# Patient Record
Sex: MALE | Race: WHITE | NOT HISPANIC OR LATINO
[De-identification: names, ages, dates, MRNs, and addresses within clinical notes are randomized per-mention and may not be internally consistent; named-entity substitution may affect disease eponyms.]

---

## 2017-01-23 ENCOUNTER — RECORDS - HEALTHEAST (OUTPATIENT)
Dept: ADMINISTRATIVE | Facility: OTHER | Age: 22
End: 2017-01-23

## 2017-02-02 ENCOUNTER — OFFICE VISIT - HEALTHEAST (OUTPATIENT)
Dept: FAMILY MEDICINE | Facility: CLINIC | Age: 22
End: 2017-02-02

## 2017-02-02 DIAGNOSIS — F41.9 ANXIETY: ICD-10-CM

## 2017-02-02 DIAGNOSIS — R10.13 EPIGASTRIC PAIN: ICD-10-CM

## 2017-02-02 DIAGNOSIS — D17.9 LIPOMA: ICD-10-CM

## 2017-02-02 ASSESSMENT — MIFFLIN-ST. JEOR: SCORE: 1706.16

## 2017-04-11 ENCOUNTER — OFFICE VISIT - HEALTHEAST (OUTPATIENT)
Dept: FAMILY MEDICINE | Facility: CLINIC | Age: 22
End: 2017-04-11

## 2017-04-11 DIAGNOSIS — R10.13 EPIGASTRIC PAIN: ICD-10-CM

## 2017-04-11 DIAGNOSIS — R19.7 DIARRHEA, UNSPECIFIED TYPE: ICD-10-CM

## 2017-04-11 ASSESSMENT — MIFFLIN-ST. JEOR: SCORE: 1685.69

## 2017-04-12 ENCOUNTER — AMBULATORY - HEALTHEAST (OUTPATIENT)
Dept: LAB | Facility: CLINIC | Age: 22
End: 2017-04-12

## 2017-04-12 DIAGNOSIS — R19.7 DIARRHEA, UNSPECIFIED TYPE: ICD-10-CM

## 2017-04-13 ENCOUNTER — COMMUNICATION - HEALTHEAST (OUTPATIENT)
Dept: FAMILY MEDICINE | Facility: CLINIC | Age: 22
End: 2017-04-13

## 2017-04-13 LAB
GLIADIN IGA SER-ACNC: 1.4 U/ML
GLIADIN IGG SER-ACNC: 1.6 U/ML
IGA SERPL-MCNC: 108 MG/DL (ref 65–400)
TTG IGA SER-ACNC: 0.2 U/ML
TTG IGG SER-ACNC: 1.2 U/ML

## 2017-04-14 ENCOUNTER — AMBULATORY - HEALTHEAST (OUTPATIENT)
Dept: LAB | Facility: CLINIC | Age: 22
End: 2017-04-14

## 2017-04-14 DIAGNOSIS — R19.7 DIARRHEA, UNSPECIFIED TYPE: ICD-10-CM

## 2017-04-25 ENCOUNTER — RECORDS - HEALTHEAST (OUTPATIENT)
Dept: ADMINISTRATIVE | Facility: OTHER | Age: 22
End: 2017-04-25

## 2017-05-05 ENCOUNTER — COMMUNICATION - HEALTHEAST (OUTPATIENT)
Dept: FAMILY MEDICINE | Facility: CLINIC | Age: 22
End: 2017-05-05

## 2017-05-05 DIAGNOSIS — R10.13 EPIGASTRIC PAIN: ICD-10-CM

## 2019-05-10 ENCOUNTER — OFFICE VISIT - HEALTHEAST (OUTPATIENT)
Dept: FAMILY MEDICINE | Facility: CLINIC | Age: 24
End: 2019-05-10

## 2019-05-10 DIAGNOSIS — Z00.00 ROUTINE GENERAL MEDICAL EXAMINATION AT A HEALTH CARE FACILITY: ICD-10-CM

## 2019-05-10 DIAGNOSIS — Z13.1 DIABETES MELLITUS SCREENING: ICD-10-CM

## 2019-05-10 DIAGNOSIS — Z13.220 LIPID SCREENING: ICD-10-CM

## 2019-05-10 LAB
CHOLEST SERPL-MCNC: 167 MG/DL
FASTING STATUS PATIENT QL REPORTED: YES
FASTING STATUS PATIENT QL REPORTED: YES
GLUCOSE BLD-MCNC: 83 MG/DL (ref 70–125)
HDLC SERPL-MCNC: 63 MG/DL
LDLC SERPL CALC-MCNC: 94 MG/DL
TRIGL SERPL-MCNC: 52 MG/DL

## 2019-05-10 ASSESSMENT — MIFFLIN-ST. JEOR: SCORE: 1687.84

## 2019-05-12 ENCOUNTER — COMMUNICATION - HEALTHEAST (OUTPATIENT)
Dept: FAMILY MEDICINE | Facility: CLINIC | Age: 24
End: 2019-05-12

## 2019-09-24 ENCOUNTER — COMMUNICATION - HEALTHEAST (OUTPATIENT)
Dept: FAMILY MEDICINE | Facility: CLINIC | Age: 24
End: 2019-09-24

## 2019-10-02 ENCOUNTER — OFFICE VISIT - HEALTHEAST (OUTPATIENT)
Dept: FAMILY MEDICINE | Facility: CLINIC | Age: 24
End: 2019-10-02

## 2019-10-02 DIAGNOSIS — Z71.84 COUNSELING FOR TRAVEL: ICD-10-CM

## 2019-10-02 ASSESSMENT — MIFFLIN-ST. JEOR: SCORE: 1744.99

## 2021-05-28 NOTE — PROGRESS NOTES
Assessment and Plan:     1. Routine general medical examination at a health care facility  Discussed consuming a healthy diet and exercising.  Updated Td vaccine.  He is content with the plan.   - Td, Preservative Free (green label)    2. Diabetes mellitus screening  - Glucose    3. Lipid screening  - Lipid Cascade    Subjective:     Talon is a 24 y.o. male presenting to the clinic for a male physical.  He has been  for 2 years.  He does not have any children.  He is consuming a healthy diet and jogs 1-2 times/week for exercise.  He is feeling well today and has no other concerns.  He denies chest pain, shortness of breath with exertion, edema, orthopnea, syncope.     Review of Systems: A complete 14 point review of systems was obtained and is negative or as stated in the history of present illness.    Social History     Socioeconomic History     Marital status:      Spouse name: Not on file     Number of children: Not on file     Years of education: Not on file     Highest education level: Not on file   Occupational History     Not on file   Social Needs     Financial resource strain: Not on file     Food insecurity:     Worry: Not on file     Inability: Not on file     Transportation needs:     Medical: Not on file     Non-medical: Not on file   Tobacco Use     Smoking status: Never Smoker     Smokeless tobacco: Never Used   Substance and Sexual Activity     Alcohol use: Not on file     Drug use: No     Sexual activity: Never   Lifestyle     Physical activity:     Days per week: Not on file     Minutes per session: Not on file     Stress: Not on file   Relationships     Social connections:     Talks on phone: Not on file     Gets together: Not on file     Attends Voodoo service: Not on file     Active member of club or organization: Not on file     Attends meetings of clubs or organizations: Not on file     Relationship status: Not on file     Intimate partner violence:     Fear of current or ex  "partner: Not on file     Emotionally abused: Not on file     Physically abused: Not on file     Forced sexual activity: Not on file   Other Topics Concern     Not on file   Social History Narrative     Not on file       Active Ambulatory Problems     Diagnosis Date Noted     No Active Ambulatory Problems     Resolved Ambulatory Problems     Diagnosis Date Noted     No Resolved Ambulatory Problems     No Additional Past Medical History       Family History   Problem Relation Age of Onset     No Medical Problems Mother      No Medical Problems Father      No Medical Problems Sister      Cancer Maternal Grandfather         lung     Heart disease Paternal Grandmother        Objective:     /60   Pulse 74   Ht 5' 11.25\" (1.81 m)   Wt 151 lb 6.4 oz (68.7 kg)   SpO2 98%   BMI 20.97 kg/m      General Appearance:    Alert, cooperative, no distress, appears stated age   Head:    Normocephalic, without obvious abnormality, atraumatic   Eyes:    PERRL, conjunctiva/corneas clear, EOM's intact, fundi     benign, both eyes        Ears:    Normal TM's and external ear canals, both ears   Nose:   Nares normal, septum midline, mucosa normal, no drainage    or sinus tenderness   Throat:   Lips, mucosa, and tongue normal; teeth and gums normal   Neck:   Supple, symmetrical, trachea midline, no adenopathy;        thyroid:  No enlargement/tenderness/nodules; no carotid    bruit or JVD   Back:     Symmetric, no curvature, ROM normal, no CVA tenderness   Lungs:     Clear to auscultation bilaterally, respirations unlabored   Chest wall:    No tenderness or deformity   Heart:    Regular rate and rhythm, S1 and S2 normal, no murmur, rub    or gallop   Abdomen:     Soft, non-tender, bowel sounds active all four quadrants,     no masses, no organomegaly   Genitalia:    Deferred per patient   Rectal:    deferred   Extremities:   Extremities normal, atraumatic, no cyanosis or edema   Pulses:   2+ and symmetric all extremities   Skin:   " Skin color, texture, turgor normal, no rashes or lesions   Lymph nodes:   Cervical, supraclavicular, and axillary nodes normal   Neurologic:   CNII-XII intact. Normal strength, sensation and reflexes       throughout

## 2021-05-30 VITALS — WEIGHT: 158.06 LBS | HEIGHT: 71 IN | BODY MASS INDEX: 22.13 KG/M2

## 2021-05-30 VITALS — BODY MASS INDEX: 21.25 KG/M2 | WEIGHT: 151.8 LBS | HEIGHT: 71 IN

## 2021-06-01 NOTE — PATIENT INSTRUCTIONS - HE
The CDC website has excellent recommendations for travel.  Be sure to take a look at it: https://wwwnc.cdc.gov/travel/destinations/list/    You are up-to-date with hepatitis A, hepatitis B, measles, tetanus, and your routine vaccinations.  If you are going to be in an area with wild animals, you could consider the rabies vaccine series though this likely will not be needed.    I have sent a prescription for the oral typhoid vaccine.  If this is not well covered by insurance, you may ask the pharmacist about the injected typhoid vaccine.    Be sure to receive your influenza vaccine this fall.  Let us know when you receive it and we will keep it as part of your record.

## 2021-06-01 NOTE — PROGRESS NOTES
Assessment/Plan:     1. Counseling for travel  Information provided from [facility] website in regards to travel recommendations.  Prescription provided for oral typhoid vaccine.  In the event that this is not an covered by his insurance, he could consider the typhoid injection as well.  He will receive influenza vaccine this fall.  Otherwise up-to-date with routine vaccinations.  Reviewed travel safety measures.      Patient Instructions   The CDC website has excellent recommendations for travel.  Be sure to take a look at it: https://wwwnc.cdc.gov/travel/destinations/list/    You are up-to-date with hepatitis A, hepatitis B, measles, tetanus, and your routine vaccinations.  If you are going to be in an area with wild animals, you could consider the rabies vaccine series though this likely will not be needed.    I have sent a prescription for the oral typhoid vaccine.  If this is not well covered by insurance, you may ask the pharmacist about the injected typhoid vaccine.    Be sure to receive your influenza vaccine this fall.  Let us know when you receive it and we will keep it as part of your record.       Return in about 2 years (around 10/2/2021) for Annual physical.      Subjective:      Talon Kim is a 24 y.o. male presented to clinic today for travel consultation prior to travel to [place] where he will be visiting his wife's family.  He is not anticipating any animal exposure.  He is up-to-date with hepatitis A, hepatitis B, measles, and routine vaccinations.  He will be receiving influenza vaccine this fall.  He has never received typhoid vaccine previously.  He is otherwise healthy and takes no daily medications.    Current Outpatient Medications   Medication Sig Dispense Refill     multivitamin with minerals (THERA-M) 9 mg iron-400 mcg Tab tablet Take 1 tablet by mouth daily.       No current facility-administered medications for this visit.        Past Medical History, Family History, and Social History  reviewed.  History reviewed. No pertinent past medical history.  History reviewed. No pertinent surgical history.  Patient has no known allergies.  Family History   Problem Relation Age of Onset     No Medical Problems Mother      No Medical Problems Father      No Medical Problems Sister      Cancer Maternal Grandfather         lung     Heart disease Paternal Grandmother      Social History     Socioeconomic History     Marital status:      Spouse name: Not on file     Number of children: Not on file     Years of education: Not on file     Highest education level: Not on file   Occupational History     Not on file   Social Needs     Financial resource strain: Not on file     Food insecurity:     Worry: Not on file     Inability: Not on file     Transportation needs:     Medical: Not on file     Non-medical: Not on file   Tobacco Use     Smoking status: Never Smoker     Smokeless tobacco: Never Used   Substance and Sexual Activity     Alcohol use: Yes     Alcohol/week: 2.0 standard drinks     Types: 1 Glasses of wine, 1 Cans of beer per week     Drug use: No     Sexual activity: Never   Lifestyle     Physical activity:     Days per week: Not on file     Minutes per session: Not on file     Stress: Not on file   Relationships     Social connections:     Talks on phone: Not on file     Gets together: Not on file     Attends Confucianist service: Not on file     Active member of club or organization: Not on file     Attends meetings of clubs or organizations: Not on file     Relationship status: Not on file     Intimate partner violence:     Fear of current or ex partner: Not on file     Emotionally abused: Not on file     Physically abused: Not on file     Forced sexual activity: Not on file   Other Topics Concern     Not on file   Social History Narrative     Not on file         Review of systems is as stated in HPI, and the remainder of the 10 system review is otherwise negative.    Objective:     Vitals:     "10/02/19 0834   BP: 116/64   Pulse: 82   Resp: 20   Temp: 98.3  F (36.8  C)   TempSrc: Oral   SpO2: 100%   Weight: 164 lb (74.4 kg)   Height: 5' 11.25\" (1.81 m)    Body mass index is 22.71 kg/m .    Alert male.  Mucous membranes moist.  Heart with regular rate and rhythm.  Lungs clear.      This note has been dictated using voice recognition software. Any grammatical or context distortions are unintentional and inherent to the the software.   "

## 2021-06-03 VITALS — WEIGHT: 151.4 LBS | HEIGHT: 71 IN | BODY MASS INDEX: 21.19 KG/M2

## 2021-06-03 VITALS
WEIGHT: 164 LBS | DIASTOLIC BLOOD PRESSURE: 64 MMHG | RESPIRATION RATE: 20 BRPM | SYSTOLIC BLOOD PRESSURE: 116 MMHG | HEIGHT: 71 IN | TEMPERATURE: 98.3 F | OXYGEN SATURATION: 100 % | HEART RATE: 82 BPM | BODY MASS INDEX: 22.96 KG/M2

## 2021-06-08 NOTE — PROGRESS NOTES
Assessment  1. Epigastric pain  Comprehensive Metabolic Panel    HM2(CBC w/o Differential)    Lipase   2. Anxiety     3. Lipoma         Plan    1.  Epigastric pain: This is more of a discomfort.  Discussed differential diagnosis with patient.  Suspect some mild underlying gastritis as cause of symptoms, especially as he does have some associated heartburn and chest tightness.  Check comprehensive metabolic panel, hemogram and lipase today.  Recommend trial of omeprazole 20 mg daily.  Prescription sent to pharmacy.  Counseled on use of medication.  Advised avoidance of spicy and acidic foods.  Avoid caffeine and alcohol.  Notify me if symptoms not improving with these measures.  2.  Anxiety: Likely related to starting a new job and moving to a new residence.  May continue to use hydroxyzine on an as-needed basis.  3.  Lipomas: Reassurance provided.  If they increase in size or cause discomfort, discussed that they can be removed.      Subjective  Talon Kim is a 21 y.o. male who comes in today complaining of abdominal discomfort as well as anxiety.  He is a new patient to the clinic.  Recently moved to [place] and started a new job about 2 weeks ago.  Works as an .  Reports that around this time he started noticing some feelings of anxiety and nervousness.  Also had a tight feeling in his chest.  He went to an urgent care facility about a week ago because he was concerned that symptoms could be related to his heart.  He reports that an EKG was performed and it was normal.  No lab work was done.  He was told that symptoms were due to anxiety.  He was provided with a prescription for hydroxyzine to use for sleep.  He took that medication and felt that it was helpful.  He is no longer taking it every day because he is sleeping better.  States that he still continues to have that occasional tight feeling in his chest.  His main concern is that he has not developed indigestion after eating.  He will feel  "gassiness and upset/sour stomach.  This occurs no matter what types of foods he eats.  He has been trying to eat a more bland diet.  He feels nauseated but has not had vomiting.  Bowel movements have been a little bit loose but more recently he is a little bit on the constipated side.  He has not had bloody or black/tarry looking stools.  He does not take ibuprofen or aspirin on a regular basis.  He tried Pepto-Bismol for his symptoms but that was not very helpful.  He does not eat a lot of spicy or acidic foods.  Does not drink a lot of caffeine or alcohol.  He has also noticed a little bit of tenderness in his left side area.  He has occasional heartburn.  No dyspnea.  No fevers or chills.  He also notes a couple of lumps on his left thigh that has been present most of his life.  Remainder of review of systems is otherwise negative.  He is a healthy person with no history of chronic medical problems.  He takes no regular prescription medications.  No previous surgeries.  Family history is reviewed and entered into the chart.  He is single and has no children.  He does not smoke or use drugs.  Drinks alcohol infrequently.  Not sexually active.  He has no other concerns or questions today.    Current Outpatient Prescriptions   Medication Sig Dispense Refill     FOLIC ACID/MULTIVIT-MIN/LUTEIN (CENTRUM SILVER ORAL) Take by mouth daily.       hydrOXYzine (ATARAX) 25 MG tablet Take 25 mg by mouth.       omeprazole (PRILOSEC) 20 MG capsule Take 1 capsule (20 mg total) by mouth daily. 30 capsule 1     No current facility-administered medications for this visit.          Objective   Visit Vitals     /60 (Patient Site: Right Arm, Patient Position: Sitting, Cuff Size: Adult Regular)     Pulse (!) 103     Temp 97.5  F (36.4  C) (Tympanic)     Ht 5' 10.5\" (1.791 m)     Wt 158 lb 1 oz (71.7 kg)     SpO2 99%     BMI 22.36 kg/m2         Gen: alert, no acute distress  HEENT;  NCAT, TMs normal, nose clear, OP clear  Neck: " supple, no lymphadenopathy, thyroid normal  CV: RRR, no murmur  Resp: CTAB, no wheeze/crackles  Abd: normal bowel sounds, soft, non-tender, non-distended, no masses  Ext: warm, dry, no edema  Skin: 2 discrete soft tissue masses present in lateral left thigh and anteromedial left thigh suggestive of lipomas    PHQ-9: 4  PARAS-7: 7

## 2021-06-10 NOTE — PROGRESS NOTES
Talon is a 22 y.o. male presenting to the clinic for concerns of ongoing abdominal pain.  Patient was seen on 2/2/17 with concerns of epigastric pain.  He was treated with omeprazole.  Patient states he found minimal relief and stopped the medication 1 month ago.  He states his symptoms began in January when he was feeling anxious about his new job.  Patient states anxiety has improved but he continues to experience symptoms.  He has had a loose bowel movement daily in the morning.  He denies blood or mucus in his stool.  He has not taken any recent antibiotics but did travel to [place] in January.  He is taking Advil once every 2 weeks.  He describes the pain as an intermittent dull ache within his mid epigastric and left upper abdomen.  He denies any correlation of symptoms with consumption of food.  He denies family history of colon abnormalities.    Review of Systems: A complete 14 point review of systems was obtained and is negative or as stated in the history of present illness.    Social History     Social History     Marital status: Single     Spouse name: N/A     Number of children: N/A     Years of education: N/A     Occupational History     Not on file.     Social History Main Topics     Smoking status: Never Smoker     Smokeless tobacco: Not on file     Alcohol use Not on file     Drug use: No     Sexual activity: No     Other Topics Concern     Not on file     Social History Narrative     No narrative on file       Active Ambulatory Problems     Diagnosis Date Noted     No Active Ambulatory Problems     Resolved Ambulatory Problems     Diagnosis Date Noted     No Resolved Ambulatory Problems     No Additional Past Medical History       Family History   Problem Relation Age of Onset     No Medical Problems Mother      No Medical Problems Father      No Medical Problems Sister      Cancer Maternal Grandfather      lung     Heart disease Paternal Grandmother        OBJECTIVE:     /62 (Patient Site:  "Left Arm, Patient Position: Sitting, Cuff Size: Adult Large)  Pulse (!) 103  Ht 5' 11\" (1.803 m)  Wt 151 lb 12.8 oz (68.9 kg)  SpO2 99%  BMI 21.17 kg/m2    Patient is alert, in no obvious distress.   Skin: Warm, dry.   Lungs:  Clear to auscultation. Respirations even and unlabored.  No wheezing or rales noted.   Heart:  Regular rate and rhythm.  No murmurs, S3, S4, gallops, or rubs.    Abdomen: Soft, nontender.  No organomegaly. Bowel sounds normoactive. No guarding or masses noted.     ASSESSMENT AND PLAN:     1. Diarrhea, unspecified type  H. pylori Antibody, IgG    Celiac(Gluten)Antibody Panel    Giardia Detection, Stool    Ova and Parasite, Stool    Culture, Stool    Fecal WBC's    C. Diff Toxin By PCR    Ova and Parasite, Stool    Ova and Parasite, Stool    Ambulatory referral to Gastroenterology    Thyroid [place]   2. Epigastric pain       We will rule out H. pylori, celiac, ova and parasites, and thyroid disease.  Differentials include gastritis, food intolerance, anxiety/stress, and infection.  Discussed resuming omeprazole and avoidance of NSAIDs.  Will refer to gastroenterology for further evaluation if labs are normal.  Further plans pending the results.  He is content with the plan.    "

## 2021-06-19 NOTE — LETTER
Letter by Marquita Brown CNP at      Author: Marquita Brown CNP Service: -- Author Type: --    Filed:  Encounter Date: 5/12/2019 Status: (Other)         Talon Kim  [address]  [place] [address]             May 12, 2019         Dear Mr. Kim,    Below are the results from your recent visit:    Resulted Orders   Glucose   Result Value Ref Range    Glucose 83 70 - 125 mg/dL    Patient Fasting > 8hrs? Yes     Narrative    Fasting Glucose reference range is 70-99 mg/dL per  American Diabetes Association (ADA) guidelines.   Lipid Cascade   Result Value Ref Range    Cholesterol 167 <=199 mg/dL    Triglycerides 52 <=149 mg/dL    HDL Cholesterol 63 >=40 mg/dL    LDL Calculated 94 <=129 mg/dL    Patient Fasting > 8hrs? Yes      Your lab results are normal.     Please call with questions or contact us using [employer].    Sincerely,        Electronically signed by Marquita Brown CNP

## 2021-08-15 ENCOUNTER — HEALTH MAINTENANCE LETTER (OUTPATIENT)
Age: 26
End: 2021-08-15

## 2021-10-11 ENCOUNTER — HEALTH MAINTENANCE LETTER (OUTPATIENT)
Age: 26
End: 2021-10-11

## 2022-09-25 ENCOUNTER — HEALTH MAINTENANCE LETTER (OUTPATIENT)
Age: 27
End: 2022-09-25

## 2023-05-30 ENCOUNTER — LAB (OUTPATIENT)
Dept: LAB | Facility: CLINIC | Age: 28
End: 2023-05-30
Payer: COMMERCIAL

## 2023-05-30 DIAGNOSIS — Z31.9 PROCREATIVE MANAGEMENT: ICD-10-CM

## 2023-05-30 LAB
ABNORMAL SPERM MORPHOLOGY: 100
ABSTINENCE DAYS: 4 DAYS (ref 2–7)
AGGLUTINATION: NO
ANALYSIS TEMP - CENTIGRADE: 21 CENTIGRADE
COLLECTION METHOD: ABNORMAL
COLLECTION SITE: ABNORMAL
CONSENT TO RELEASE TO PARTNER: YES
DAL- RECEIVED TIME: ABNORMAL
HEAD DEFECT: 100 %
IMMOTILE: 75 %
LIQUEFIED: YES
MIDPIECE DEFECT: 41 %
NON-PROGRESSIVE MOTILITY: 8 %
NORMAL SPERM MORPHOLOGY: 0 % NORMAL FORMS
PROGRESSIVE MOTILITY: 17 %
ROUND CELLS: 0 MILLION/ML
SPECIMEN PH: 7.6 PH
SPECIMEN VOLUME: 3 ML
SPERM CONCENTRATION: 10.2 MILLION/ML
TAIL DEFECT: 8 %
TIME OF ANALYSIS: ABNORMAL
TOTAL PROGRESSIVE MOTILE NUMBER: 5 MILLION
TOTAL SPERM NUMBER: 31 MILLION
VISCOUS: NO
VITALITY: 49 %

## 2023-05-30 PROCEDURE — 89322 SEMEN ANAL STRICT CRITERIA: CPT

## 2023-07-20 ENCOUNTER — LAB (OUTPATIENT)
Dept: LAB | Facility: CLINIC | Age: 28
End: 2023-07-20
Payer: COMMERCIAL

## 2023-07-20 DIAGNOSIS — Z31.41 ENCOUNTER FOR SPERM COUNT FOR FERTILITY TESTING: ICD-10-CM

## 2023-07-20 LAB
ABNORMAL SPERM MORPHOLOGY: 97
ABSTINENCE DAYS: 5 DAYS (ref 2–7)
AGGLUTINATION: NO
ANALYSIS TEMP - CENTIGRADE: 22 CENTIGRADE
COLLECTION METHOD: ABNORMAL
COLLECTION SITE: ABNORMAL
CONSENT TO RELEASE TO PARTNER: YES
DAL- RECEIVED TIME: ABNORMAL
HEAD DEFECT: 97 %
IMMOTILE: 58 %
LIQUEFIED: YES
MIDPIECE DEFECT: 33 %
NON-PROGRESSIVE MOTILITY: 5 %
NORMAL SPERM MORPHOLOGY: 3 % NORMAL FORMS
PROGRESSIVE MOTILITY: 37 %
ROUND CELLS: <0.1 MILLION/ML
SPECIMEN PH: 7.6 PH
SPECIMEN VOLUME: 4 ML
SPERM CONCENTRATION: 12 MILLION/ML
TAIL DEFECT: 9 %
TIME OF ANALYSIS: ABNORMAL
TOTAL PROGRESSIVE MOTILE NUMBER: 18 MILLION
TOTAL SPERM NUMBER: 48 MILLION
VISCOUS: NO
VITALITY: ABNORMAL

## 2023-07-20 PROCEDURE — 89322 SEMEN ANAL STRICT CRITERIA: CPT

## 2023-10-18 ENCOUNTER — LAB (OUTPATIENT)
Dept: LAB | Facility: CLINIC | Age: 28
End: 2023-10-18
Payer: COMMERCIAL

## 2023-10-18 DIAGNOSIS — Z31.41 ENCOUNTER FOR SPERM COUNT FOR FERTILITY TESTING: ICD-10-CM

## 2023-10-18 PROCEDURE — 89322 SEMEN ANAL STRICT CRITERIA: CPT

## 2023-10-19 LAB
ABNORMAL SPERM MORPHOLOGY: 100
ABSTINENCE DAYS: 3 DAYS (ref 2–7)
AGGLUTINATION: NO
ANALYSIS TEMP - CENTIGRADE: 19 CENTIGRADE
COLLECTION METHOD: ABNORMAL
COLLECTION SITE: ABNORMAL
CONSENT TO RELEASE TO PARTNER: YES
DAL- RECEIVED TIME: ABNORMAL
HEAD DEFECT: 100 %
IMMOTILE: 59 %
LIQUEFIED: YES
MIDPIECE DEFECT: 52 %
NON-PROGRESSIVE MOTILITY: 4 %
NORMAL SPERM MORPHOLOGY: 0 % NORMAL FORMS
PROGRESSIVE MOTILITY: 37 %
ROUND CELLS: <0.1 MILLION/ML
SPECIMEN PH: 7.6 PH
SPECIMEN VOLUME: 2.8 ML
SPERM CONCENTRATION: 9.6 MILLION/ML
TAIL DEFECT: 8 %
TIME OF ANALYSIS: ABNORMAL
TOTAL PROGRESSIVE MOTILE NUMBER: 10 MILLION
TOTAL SPERM NUMBER: 27 MILLION
VISCOUS: NO
VITALITY: ABNORMAL

## 2023-12-23 ENCOUNTER — HEALTH MAINTENANCE LETTER (OUTPATIENT)
Age: 28
End: 2023-12-23

## 2025-01-18 ENCOUNTER — HEALTH MAINTENANCE LETTER (OUTPATIENT)
Age: 30
End: 2025-01-18